# Patient Record
Sex: FEMALE | Race: AMERICAN INDIAN OR ALASKA NATIVE | Employment: FULL TIME | URBAN - METROPOLITAN AREA
[De-identification: names, ages, dates, MRNs, and addresses within clinical notes are randomized per-mention and may not be internally consistent; named-entity substitution may affect disease eponyms.]

---

## 2021-09-30 ENCOUNTER — LAB REQUISITION (OUTPATIENT)
Dept: LAB | Facility: CLINIC | Age: 41
End: 2021-09-30
Payer: COMMERCIAL

## 2021-09-30 ENCOUNTER — OFFICE VISIT - RIVER FALLS (OUTPATIENT)
Dept: FAMILY MEDICINE | Facility: CLINIC | Age: 41
End: 2021-09-30

## 2021-09-30 DIAGNOSIS — U07.1 COVID-19: ICD-10-CM

## 2021-09-30 PROCEDURE — U0003 INFECTIOUS AGENT DETECTION BY NUCLEIC ACID (DNA OR RNA); SEVERE ACUTE RESPIRATORY SYNDROME CORONAVIRUS 2 (SARS-COV-2) (CORONAVIRUS DISEASE [COVID-19]), AMPLIFIED PROBE TECHNIQUE, MAKING USE OF HIGH THROUGHPUT TECHNOLOGIES AS DESCRIBED BY CMS-2020-01-R: HCPCS | Mod: ORL | Performed by: FAMILY MEDICINE

## 2021-09-30 ASSESSMENT — MIFFLIN-ST. JEOR: SCORE: 1450.14

## 2021-10-03 LAB — SARS-COV-2 RNA RESP QL NAA+PROBE: NOT DETECTED

## 2021-10-06 LAB — SARS-COV-2 RNA RESP QL NAA+PROBE: NEGATIVE

## 2022-02-11 VITALS
HEIGHT: 61 IN | DIASTOLIC BLOOD PRESSURE: 78 MMHG | WEIGHT: 188 LBS | BODY MASS INDEX: 35.5 KG/M2 | SYSTOLIC BLOOD PRESSURE: 126 MMHG | HEART RATE: 76 BPM

## 2022-02-16 NOTE — NURSING NOTE
Depression Screening Entered On:  11/2/2021 12:17 PM CDT    Performed On:  9/30/2021 12:16 PM CDT by Demetria Cobb               Depression Screening   Little Interest - Pleasure in Activities :   Not at all   Feeling Down, Depressed, Hopeless :   Not at all   Initial Depression Screen Score :   0 Score   Poor Appetite or Overeating :   Not at all   Trouble Falling or Staying Asleep :   Not at all   Feeling Tired or Little Energy :   Not at all   Feeling Bad About Yourself :   Not at all   Trouble Concentrating :   Not at all   Moving or Speaking Slowly :   Not at all   Thoughts Better Off Dead or Hurting Self :   Not at all   Detailed Depression Screen Score :   0    Total Depression Screen Score :   0    Demetria Cobb - 11/2/2021 12:16 PM CDT

## 2022-02-16 NOTE — NURSING NOTE
Comprehensive Intake Entered On:  9/30/2021 2:58 PM CDT    Performed On:  9/30/2021 2:47 PM CDT by Liza Donovan LPN               Summary   Chief Complaint :   establish care, does need covid test for travel   Weight Measured :   188 lb(Converted to: 188 lb 0 oz, 85.275 kg)    Height Measured :   61 in(Converted to: 5 ft 1 in, 154.94 cm)    Body Mass Index :   35.52 kg/m2 (HI)    Body Surface Area :   1.91 m2   Systolic Blood Pressure :   126 mmHg   Diastolic Blood Pressure :   78 mmHg   Mean Arterial Pressure :   94 mmHg   Peripheral Pulse Rate :   76 bpm   BP Site :   Right arm   Pulse Site :   Radial artery   BP Method :   Manual   HR Method :   Manual   Liza Donovan LPN - 9/30/2021 2:47 PM CDT   Health Status   Allergies Verified? :   Yes   Medication History Verified? :   Yes   Pre-Visit Planning Status :   Completed   Tobacco Use? :   Never smoker   Liza Donovan LPN - 9/30/2021 2:47 PM CDT   Consents   Consent for Immunization Exchange :   Consent Granted   Consent for Immunizations to Providers :   Consent Granted   Liza Donovan LPN - 9/30/2021 2:47 PM CDT   Meds / Allergies   (As Of: 9/30/2021 2:58:11 PM CDT)   Allergies (Active)   No Known Medication Allergies  Estimated Onset Date:   Unspecified ; Created By:   Liza Donovan LPN; Reaction Status:   Active ; Category:   Drug ; Substance:   No Known Medication Allergies ; Type:   Allergy ; Updated By:   Liza Donovan LPN; Reviewed Date:   9/30/2021 2:56 PM CDT        Medication List   (As Of: 9/30/2021 2:58:11 PM CDT)   Home Meds    carBAMazepine  :   carBAMazepine ; Status:   Documented ; Ordered As Mnemonic:   carBAMazepine 200 mg oral capsule, extended release ; Simple Display Line:   200 mg, 1 cap(s), Oral, daily, 60 cap(s), 0 Refill(s) ; Catalog Code:   carBAMazepine ; Order Dt/Tm:   9/30/2021 2:56:20 PM CDT            Social History   Social History   (As Of: 9/30/2021 2:58:11 PM CDT)   Tobacco:        Never (less than  100 in lifetime)   (Last Updated: 9/30/2021 2:54:16 PM CDT by Liza Donovan LPN)          Electronic Cigarette/Vaping:        Electronic Cigarette Use: Never.   (Last Updated: 9/30/2021 2:54:53 PM CDT by Liza Donovan LPN)

## 2022-02-16 NOTE — PROGRESS NOTES
"   Patient:   JEANNETTE ROSS            MRN: [id number]            FIN: [id number]               Age:   41 years     Sex:  Female     :  1980   Associated Diagnoses:   Encounter for screening for COVID-19   Author:   James Torres MD      Visit Information      Date of Service: 2021 02:32 pm  Performing Location: [facility]  Encounter#: [id number]      Primary Care Provider (PCP):  NONE ,       Referring Provider:  James Torres MD    NPI# [id number]      Chief Complaint   2021 2:47 PM CDT    establish care, does need covid test for travel        Subjective   Additional information living in town but travels frequently to family and home\" in [place]  needs covid before going back  test required within 72 hours.        Health Status   Allergies:    Allergic Reactions (Selected)  No Known Medication Allergies   Medications:  (Selected)   Documented Medications  Documented  carBAMazepine 200 mg oral capsule, extended release: = 1 cap(s) ( 200 mg ), Oral, daily, # 60 cap(s), 0 Refill(s), Type: Maintenance,    Medications          *denotes recorded medication          *carBAMazepine 200 mg oral capsule, extended release: 200 mg, 1 cap(s), Oral, daily, 60 cap(s), 0 Refill(s).       Problem list:    All Problems  Obesity / [id number] / Probable  Trigeminal neuralgia / [id number] / Confirmed      Objective   Vital Signs   2021 2:47 PM CDT Peripheral Pulse Rate 76 bpm    Pulse Site Radial artery    HR Method Manual    Systolic Blood Pressure 126 mmHg    Diastolic Blood Pressure 78 mmHg    Mean Arterial Pressure 94 mmHg    BP Site Right arm    BP Method Manual      Measurements from flowsheet : Measurements   2021 2:47 PM CDT Height Measured 61 in    Weight Measured 188 lb    BSA 1.91 m2    Body Mass Index 35.52 kg/m2  HI      General:  Alert and oriented, No acute distress.    Eye:  Normal conjunctiva.    Respiratory:  Respirations are non-labored.    Musculoskeletal:  Normal " gait.    Integumentary:  Warm.    Psychiatric:  Cooperative, Appropriate mood & affect.       Impression and Plan   Assessment and Plan:          Diagnosis: Encounter for screening for COVID-19 ([id number] Z11.52).         Course: has established a chart her but expects to get most of her care at home in [place]  covid test done.

## 2022-03-01 ENCOUNTER — TELEPHONE (OUTPATIENT)
Dept: NEUROSURGERY | Facility: CLINIC | Age: 42
End: 2022-03-01

## 2022-03-01 NOTE — TELEPHONE ENCOUNTER
Writer routed New Patient referral to Neurosurgery Clinic Scheduling.     New Patient   Trigeminal Neurologia.     Piper Gaspar LPN  Neurosurgery

## 2023-09-29 ENCOUNTER — OFFICE VISIT (OUTPATIENT)
Dept: FAMILY MEDICINE | Facility: CLINIC | Age: 43
End: 2023-09-29
Payer: COMMERCIAL

## 2023-09-29 VITALS
BODY MASS INDEX: 30.99 KG/M2 | RESPIRATION RATE: 20 BRPM | HEART RATE: 72 BPM | TEMPERATURE: 97.7 F | DIASTOLIC BLOOD PRESSURE: 94 MMHG | HEIGHT: 65 IN | OXYGEN SATURATION: 98 % | SYSTOLIC BLOOD PRESSURE: 148 MMHG | WEIGHT: 186 LBS

## 2023-09-29 DIAGNOSIS — R06.83 SNORING: ICD-10-CM

## 2023-09-29 DIAGNOSIS — I10 PRIMARY HYPERTENSION: Primary | ICD-10-CM

## 2023-09-29 DIAGNOSIS — E66.09 CLASS 1 OBESITY DUE TO EXCESS CALORIES WITHOUT SERIOUS COMORBIDITY WITH BODY MASS INDEX (BMI) OF 30.0 TO 30.9 IN ADULT: ICD-10-CM

## 2023-09-29 DIAGNOSIS — E66.811 CLASS 1 OBESITY DUE TO EXCESS CALORIES WITHOUT SERIOUS COMORBIDITY WITH BODY MASS INDEX (BMI) OF 30.0 TO 30.9 IN ADULT: ICD-10-CM

## 2023-09-29 DIAGNOSIS — G50.0 TRIGEMINAL NEURALGIA: Chronic | ICD-10-CM

## 2023-09-29 PROBLEM — Z66 DO NOT RESUSCITATE STATUS: Status: ACTIVE | Noted: 2022-04-28

## 2023-09-29 PROBLEM — Z87.898 HISTORY OF DISEASE: Status: ACTIVE | Noted: 2022-04-26

## 2023-09-29 PROBLEM — Z87.898 HISTORY OF DISEASE: Status: RESOLVED | Noted: 2022-04-26 | Resolved: 2023-09-29

## 2023-09-29 PROBLEM — Z86.16 HISTORY OF SEVERE ACUTE RESPIRATORY SYNDROME CORONAVIRUS 2 (SARS-COV-2) DISEASE: Status: RESOLVED | Noted: 2022-03-24 | Resolved: 2023-09-29

## 2023-09-29 PROBLEM — Z86.16 HISTORY OF SEVERE ACUTE RESPIRATORY SYNDROME CORONAVIRUS 2 (SARS-COV-2) DISEASE: Status: ACTIVE | Noted: 2022-03-24

## 2023-09-29 PROBLEM — E66.9 OBESITY WITH BODY MASS INDEX 30 OR GREATER: Status: ACTIVE | Noted: 2022-04-26

## 2023-09-29 LAB
ALBUMIN UR-MCNC: NEGATIVE MG/DL
ANION GAP SERPL CALCULATED.3IONS-SCNC: 11 MMOL/L (ref 7–15)
APPEARANCE UR: CLEAR
BILIRUB UR QL STRIP: NEGATIVE
BUN SERPL-MCNC: 18.3 MG/DL (ref 6–20)
CALCIUM SERPL-MCNC: 9.4 MG/DL (ref 8.6–10)
CHLORIDE SERPL-SCNC: 106 MMOL/L (ref 98–107)
CHOLEST SERPL-MCNC: 247 MG/DL
COLOR UR AUTO: YELLOW
CREAT SERPL-MCNC: 0.74 MG/DL (ref 0.51–0.95)
CREAT UR-MCNC: 137 MG/DL
DEPRECATED HCO3 PLAS-SCNC: 23 MMOL/L (ref 22–29)
EGFRCR SERPLBLD CKD-EPI 2021: >90 ML/MIN/1.73M2
ERYTHROCYTE [DISTWIDTH] IN BLOOD BY AUTOMATED COUNT: 11.7 % (ref 10–15)
GLUCOSE SERPL-MCNC: 98 MG/DL (ref 70–99)
GLUCOSE UR STRIP-MCNC: NEGATIVE MG/DL
HCT VFR BLD AUTO: 42.4 % (ref 35–47)
HDLC SERPL-MCNC: 86 MG/DL
HGB BLD-MCNC: 14.1 G/DL (ref 11.7–15.7)
HGB UR QL STRIP: NEGATIVE
KETONES UR STRIP-MCNC: NEGATIVE MG/DL
LDLC SERPL CALC-MCNC: 145 MG/DL
LEUKOCYTE ESTERASE UR QL STRIP: NEGATIVE
MCH RBC QN AUTO: 29.7 PG (ref 26.5–33)
MCHC RBC AUTO-ENTMCNC: 33.3 G/DL (ref 31.5–36.5)
MCV RBC AUTO: 90 FL (ref 78–100)
MICROALBUMIN UR-MCNC: <12 MG/L
MICROALBUMIN/CREAT UR: NORMAL MG/G{CREAT}
NITRATE UR QL: NEGATIVE
NONHDLC SERPL-MCNC: 161 MG/DL
PH UR STRIP: 7 [PH] (ref 5–7)
PLATELET # BLD AUTO: 307 10E3/UL (ref 150–450)
POTASSIUM SERPL-SCNC: 5.2 MMOL/L (ref 3.4–5.3)
RBC # BLD AUTO: 4.74 10E6/UL (ref 3.8–5.2)
SODIUM SERPL-SCNC: 140 MMOL/L (ref 135–145)
SP GR UR STRIP: 1.02 (ref 1–1.03)
T4 FREE SERPL-MCNC: 0.88 NG/DL (ref 0.9–1.7)
TRIGL SERPL-MCNC: 80 MG/DL
TSH SERPL DL<=0.005 MIU/L-ACNC: 4.93 UIU/ML (ref 0.3–4.2)
UROBILINOGEN UR STRIP-ACNC: 0.2 E.U./DL
WBC # BLD AUTO: 6.7 10E3/UL (ref 4–11)

## 2023-09-29 PROCEDURE — 99000 SPECIMEN HANDLING OFFICE-LAB: CPT | Performed by: INTERNAL MEDICINE

## 2023-09-29 PROCEDURE — 84439 ASSAY OF FREE THYROXINE: CPT | Performed by: INTERNAL MEDICINE

## 2023-09-29 PROCEDURE — 84244 ASSAY OF RENIN: CPT | Mod: 90 | Performed by: INTERNAL MEDICINE

## 2023-09-29 PROCEDURE — 84443 ASSAY THYROID STIM HORMONE: CPT | Performed by: INTERNAL MEDICINE

## 2023-09-29 PROCEDURE — 82570 ASSAY OF URINE CREATININE: CPT | Performed by: INTERNAL MEDICINE

## 2023-09-29 PROCEDURE — 80048 BASIC METABOLIC PNL TOTAL CA: CPT | Performed by: INTERNAL MEDICINE

## 2023-09-29 PROCEDURE — 85027 COMPLETE CBC AUTOMATED: CPT | Performed by: INTERNAL MEDICINE

## 2023-09-29 PROCEDURE — 93000 ELECTROCARDIOGRAM COMPLETE: CPT | Performed by: INTERNAL MEDICINE

## 2023-09-29 PROCEDURE — 99214 OFFICE O/P EST MOD 30 MIN: CPT | Mod: 25 | Performed by: INTERNAL MEDICINE

## 2023-09-29 PROCEDURE — 82088 ASSAY OF ALDOSTERONE: CPT | Performed by: INTERNAL MEDICINE

## 2023-09-29 PROCEDURE — 80061 LIPID PANEL: CPT | Performed by: INTERNAL MEDICINE

## 2023-09-29 PROCEDURE — 81003 URINALYSIS AUTO W/O SCOPE: CPT | Mod: QW | Performed by: INTERNAL MEDICINE

## 2023-09-29 PROCEDURE — 82043 UR ALBUMIN QUANTITATIVE: CPT | Performed by: INTERNAL MEDICINE

## 2023-09-29 PROCEDURE — 36415 COLL VENOUS BLD VENIPUNCTURE: CPT | Performed by: INTERNAL MEDICINE

## 2023-09-29 RX ORDER — BACLOFEN 10 MG/1
5 TABLET ORAL
COMMUNITY
Start: 2023-07-19 | End: 2024-01-15

## 2023-09-29 RX ORDER — CARBAMAZEPINE 200 MG/1
200 TABLET ORAL 3 TIMES DAILY
COMMUNITY
Start: 2023-08-11

## 2023-09-29 ASSESSMENT — ENCOUNTER SYMPTOMS
PALPITATIONS: 0
ABDOMINAL DISTENTION: 0
NAUSEA: 0
COUGH: 0
HEARTBURN: 1
DIFFICULTY URINATING: 0
SPEECH DIFFICULTY: 0
VOMITING: 0
FACIAL ASYMMETRY: 0
DYSURIA: 0
UNEXPECTED WEIGHT CHANGE: 0
ABDOMINAL PAIN: 0
SHORTNESS OF BREATH: 0
HEADACHES: 0
WEAKNESS: 0
SLEEP DISTURBANCE: 0

## 2023-09-29 NOTE — ASSESSMENT & PLAN NOTE
High pretest probability of sleep apnea given possible hypertension, severe snoring, overweight  Discussed the options for diagnosis and she would like to proceed with a home test

## 2023-09-29 NOTE — ASSESSMENT & PLAN NOTE
-Treated 2018-19  -High since 7/23  -Not currently sexually active due to her trigeminal neuralgia  -Hypertension work-up  -Monitoring of blood pressures at home.  They will contact me with readings to decide upon treatment if indicated  -The potential for potential for drug interactions with Tegretol and baclofen, losartan appears to be a good choice, with the caveat that it is not good in pregnancy and she would need a BMP within 2 weeks of starting it.  Addressed the need for treatment upon her return to [place]

## 2023-09-29 NOTE — PROGRESS NOTES
"  Assessment & Plan   Problem List Items Addressed This Visit       Class 1 obesity due to excess calories without serious comorbidity with body mass index (BMI) of 30.0 to 30.9 in adult     She is physically active  Encouraged weight loss         Primary hypertension - Primary (Chronic)     -Treated 2018-19  -High since 7/23  -Not currently sexually active due to her trigeminal neuralgia  -Hypertension work-up  -Monitoring of blood pressures at home.  They will contact me with readings to decide upon treatment if indicated  -The potential for potential for drug interactions with Tegretol and baclofen, losartan appears to be a good choice, with the caveat that it is not good in pregnancy and she would need a BMP within 2 weeks of starting it.  Addressed the need for treatment upon her return to [place]         Relevant Orders    Basic metabolic panel  (Ca, Cl, CO2, Creat, Gluc, K, Na, BUN)    CBC with platelets (Completed)    Albumin Random Urine Quantitative with Creat Ratio    UA Macroscopic with reflex to Microscopic and Culture - Lab Collect (Completed)    TSH with free T4 reflex    EKG 12-lead complete w/read - Clinics (Completed)    Lipid panel reflex to direct LDL Fasting    Aldosterone    Renin activity    Aldosterone Renin Ratio    HST-Home Sleep Apnea Test - Noxturnal Returnable    Trigeminal neuralgia (Chronic)    Relevant Medications    baclofen (LIORESAL) 10 MG tablet    carBAMazepine (TEGRETOL) 200 MG tablet    Snoring     High pretest probability of sleep apnea given possible hypertension, severe snoring, overweight  Discussed the options for diagnosis and she would like to proceed with a home test         Relevant Orders    HST-Home Sleep Apnea Test - Noxturnal Returnable                 BMI:   Estimated body mass index is 30.95 kg/m  as calculated from the following:    Height as of this encounter: 1.651 m (5' 5\").    Weight as of this encounter: 84.4 kg (186 lb).   Weight management plan: " "Discussed healthy diet and exercise guidelines    Follow-up upon return to [facility]    Saulo Erwin MD  [facility]    Linda Browning is a 43 year old, presenting for the following health issues:  Hypertension (New pt here for \"blood pressure concerns\"  Pt states she is having elevated home BP readings x 2 months)            History of Present Illness       Reason for visit:  Bp  Symptom onset:  More than a month    She eats 4 or more servings of fruits and vegetables daily.She consumes 0 sweetened beverage(s) daily.She exercises with enough effort to increase her heart rate 60 or more minutes per day.  She exercises with enough effort to increase her heart rate 4 days per week.   She is taking medications regularly.     Healthy 43-year-old here to discuss hypertension.  She was treated for hypertension for a time back in 2018 or 2019 but generally has not had high blood pressure.  Recently blood pressure was noted to be elevated including some readings at home.  She has trigeminal neuralgia and is going to be having repeat surgery for it but is otherwise healthy.  Will be traveling with her  in [place] for the next month.              Review of Systems   Constitutional:  Negative for unexpected weight change.   Eyes:  Negative for visual disturbance.   Respiratory:  Negative for cough and shortness of breath.         Snoring, no EDS   Cardiovascular:  Negative for chest pain, palpitations and peripheral edema.   Gastrointestinal:  Positive for heartburn. Negative for abdominal distention, abdominal pain, nausea and vomiting.   Endocrine: Negative for polyuria.   Genitourinary:  Negative for difficulty urinating and dysuria.   Neurological:  Negative for facial asymmetry, speech difficulty, weakness and headaches.   Psychiatric/Behavioral:  Negative for sleep disturbance.             Objective    BP (!) 148/94 (BP Location: Left arm)   Pulse 72   Temp 97.7  F (36.5 " " C) (Tympanic)   Resp 20   Ht 1.651 m (5' 5\")   Wt 84.4 kg (186 lb)   SpO2 98%   BMI 30.95 kg/m    Body mass index is 30.95 kg/m .  Physical Exam   Blood pressure confirmed in both arms  Healthy-appearing woman who appears comfortable and in no distress  HEENT exam unremarkable  Neck supple adenopathy thyromegaly  Lungs clear  Cardiac exam regular no murmur or edema.  Normal carotid and posterior tibial pulsations abdomen soft and nontender no mass organomegaly.  No bruit  Skin warm and dry  No hot joints  Patient is alert and oriented, speech fluent, memory good, no facial asymmetry, EOMI,  Normal strength and gait  Normal mood and affect    EKG reveals a sinus bradycardia with a rate of 58 and is otherwise normal                  "

## 2023-10-02 ENCOUNTER — TELEPHONE (OUTPATIENT)
Dept: SCHEDULING | Facility: CLINIC | Age: 43
End: 2023-10-02

## 2023-10-02 LAB — RENIN PLAS-CCNC: 1.1 NG/ML/HR

## 2023-10-02 NOTE — TELEPHONE ENCOUNTER
General Call    Contacts         Type Contact Phone/Fax    10/02/2023 01:51 PM CDT Phone (Outgoing) Nallely Parry (Self)           Reason for Call:  Pt appeared in sleep workqueue, sleep central scheduling does not schedule for [facility]. Please contact pt.     What are your questions or concerns:  na    Date of last appointment with provider: na    Could we send this information to you in [facility] or would you prefer to receive a phone call?:   Patient would prefer a phone call   Okay to leave a detailed message?: Yes at Cell number on file:    Telephone Information:   Mobile [phone]

## 2023-10-03 LAB — ALDOST SERPL-MCNC: 9.7 NG/DL (ref 0–31)

## 2023-10-04 DIAGNOSIS — E03.8 SUBCLINICAL HYPOTHYROIDISM: Primary | ICD-10-CM

## 2023-10-04 LAB — ALDOST/RENIN PLAS-RTO: 8.8 {RATIO} (ref 0–25)

## 2023-10-15 ENCOUNTER — HEALTH MAINTENANCE LETTER (OUTPATIENT)
Age: 43
End: 2023-10-15

## 2023-11-06 ENCOUNTER — OFFICE VISIT (OUTPATIENT)
Dept: FAMILY MEDICINE | Facility: CLINIC | Age: 43
End: 2023-11-06
Attending: INTERNAL MEDICINE
Payer: COMMERCIAL

## 2023-11-06 VITALS
OXYGEN SATURATION: 99 % | HEART RATE: 60 BPM | TEMPERATURE: 97.7 F | RESPIRATION RATE: 20 BRPM | HEIGHT: 65 IN | BODY MASS INDEX: 29.66 KG/M2 | SYSTOLIC BLOOD PRESSURE: 136 MMHG | WEIGHT: 178 LBS | DIASTOLIC BLOOD PRESSURE: 86 MMHG

## 2023-11-06 DIAGNOSIS — Z00.00 ROUTINE HEALTH MAINTENANCE: ICD-10-CM

## 2023-11-06 DIAGNOSIS — L81.9 PIGMENTED SKIN LESION: ICD-10-CM

## 2023-11-06 DIAGNOSIS — E78.5 DYSLIPIDEMIA: ICD-10-CM

## 2023-11-06 DIAGNOSIS — E66.811 CLASS 1 OBESITY DUE TO EXCESS CALORIES WITHOUT SERIOUS COMORBIDITY WITH BODY MASS INDEX (BMI) OF 30.0 TO 30.9 IN ADULT: ICD-10-CM

## 2023-11-06 DIAGNOSIS — R06.83 SNORING: ICD-10-CM

## 2023-11-06 DIAGNOSIS — I10 PRIMARY HYPERTENSION: Primary | Chronic | ICD-10-CM

## 2023-11-06 DIAGNOSIS — G50.0 TRIGEMINAL NEURALGIA: Chronic | ICD-10-CM

## 2023-11-06 DIAGNOSIS — E66.09 CLASS 1 OBESITY DUE TO EXCESS CALORIES WITHOUT SERIOUS COMORBIDITY WITH BODY MASS INDEX (BMI) OF 30.0 TO 30.9 IN ADULT: ICD-10-CM

## 2023-11-06 PROCEDURE — 99214 OFFICE O/P EST MOD 30 MIN: CPT | Performed by: INTERNAL MEDICINE

## 2023-11-06 ASSESSMENT — ENCOUNTER SYMPTOMS
CHILLS: 0
DIZZINESS: 0
HEMATURIA: 0
HEADACHES: 0
ABDOMINAL PAIN: 0
HEARTBURN: 0
NERVOUS/ANXIOUS: 0
DIFFICULTY URINATING: 0
JOINT SWELLING: 0
EYE PAIN: 0
PARESTHESIAS: 0
NAUSEA: 0
ABDOMINAL DISTENTION: 0
DYSURIA: 0
HEMATOCHEZIA: 0
FATIGUE: 0
SHORTNESS OF BREATH: 0
ARTHRALGIAS: 0
WEAKNESS: 0
TREMORS: 0
PALPITATIONS: 0
FEVER: 0
COUGH: 0
BREAST MASS: 0
DIARRHEA: 0
MYALGIAS: 0
SLEEP DISTURBANCE: 0
FREQUENCY: 0
SORE THROAT: 0
CONSTIPATION: 0

## 2023-11-06 NOTE — PROGRESS NOTES
Assessment & Plan   Problem List Items Addressed This Visit       Class 1 obesity due to excess calories without serious comorbidity with body mass index (BMI) of 30.0 to 30.9 in adult     She increased her physical activity while on vacation and is lost significant weight         Primary hypertension - Primary (Chronic)     -Treated 2018-19  -High since 7/23  -Not currently sexually active due to her trigeminal neuralgia  -Hypertension work-up 9/23 unrevealing  -Two home blood pressure readings are normal  -The potential for potential for drug interactions with Tegretol and baclofen, losartan appears to be a good choice, with the caveat that it is not good in pregnancy  -May be improved due to weight loss  -Home blood pressure readings         Trigeminal neuralgia (Chronic)     Patient tapered off her medication and is asymptomatic  Surgery planned later this month         Snoring     Minimal snoring without daytime sleepiness  Improved with weight loss  Stop bang score 1 if we count hypertension and for his low risk moderate to severe sleep apnea         Dyslipidemia     The 10-year ASCVD risk score (Sanjana ALMANZAR, et al., 2019) is: 0.6%    Values used to calculate the score:      Age: 43 years      Sex: Female      Is Non- : No      Diabetic: No      Tobacco smoker: No      Systolic Blood Pressure: 136 mmHg      Is BP treated: No      HDL Cholesterol: 86 mg/dL      Total Cholesterol: 247 mg/dL            Other Visit Diagnoses       Pigmented skin lesion        Unchanging 6 mm lesion left forearm    Relevant Orders    Adult Dermatology  Referral    Routine health maintenance        Patient wishes to see her gynecologist for Pap smear  Had previous hepatitis C and HIV testing  Had previous hepatitis B immunization                      Follow-up pending home blood pressure    Saulo Erwin MD  [facility]    Linda Browning is a 43 year old,  "presenting for the following health issues:  Physical and Hypertension (Pt here for a Px and a HTN check)      HPI             Patient here for follow-up of blood pressure.  While she was [place] the past 2 months tapered off baclofen and carbamazepine and has had no symptoms of trigeminal neuralgia.  Surgery later this month for trigeminal neuralgia.  Plan is to see her gynecologist for Pap smear and wellness.  Lost weight while traveling and is feeling well.  2 blood pressure readings from home were both normal..  She would like to have her Nexplanon removed.    Review of Systems   Constitutional:  Negative for fatigue.   Eyes:  Negative for visual disturbance.   Respiratory:  Negative for cough and shortness of breath.    Cardiovascular:  Negative for chest pain, palpitations and peripheral edema.   Gastrointestinal:  Negative for abdominal distention and abdominal pain.   Endocrine: Negative for polyuria.   Genitourinary:  Negative for difficulty urinating and dysuria.   Neurological:  Negative for tremors, weakness, headaches and paresthesias.   Psychiatric/Behavioral:  Negative for sleep disturbance.             Objective    /86   Pulse 60   Temp 97.7  F (36.5  C) (Tympanic)   Resp 20   Ht 1.651 m (5' 5\")   Wt 80.7 kg (178 lb)   LMP 10/06/2023 (Exact Date)   SpO2 99%   BMI 29.62 kg/m    Body mass index is 29.62 kg/m .  Physical Exam   Healthy-appearing young woman in no distress  Alert and oriented  In good spirits  6 mm raised firm purplish lesion left forearm  Nexplanon palpable right upper arm                        " Walk in

## 2023-11-06 NOTE — ASSESSMENT & PLAN NOTE
Minimal snoring without daytime sleepiness  Improved with weight loss  Stop bang score 1 if we count hypertension and for his low risk moderate to severe sleep apnea

## 2023-11-06 NOTE — ASSESSMENT & PLAN NOTE
-Treated 2018-19  -High since 7/23  -Not currently sexually active due to her trigeminal neuralgia  -Hypertension work-up 9/23 unrevealing  -Two home blood pressure readings are normal  -The potential for potential for drug interactions with Tegretol and baclofen, losartan appears to be a good choice, with the caveat that it is not good in pregnancy  -May be improved due to weight loss  -Home blood pressure readings

## 2023-11-06 NOTE — ASSESSMENT & PLAN NOTE
The 10-year ASCVD risk score (Sanjana ALMANZAR, et al., 2019) is: 0.6%    Values used to calculate the score:      Age: 43 years      Sex: Female      Is Non- : No      Diabetic: No      Tobacco smoker: No      Systolic Blood Pressure: 136 mmHg      Is BP treated: No      HDL Cholesterol: 86 mg/dL      Total Cholesterol: 247 mg/dL

## 2024-03-03 ENCOUNTER — HEALTH MAINTENANCE LETTER (OUTPATIENT)
Age: 44
End: 2024-03-03

## 2024-12-08 ENCOUNTER — HEALTH MAINTENANCE LETTER (OUTPATIENT)
Age: 44
End: 2024-12-08